# Patient Record
(demographics unavailable — no encounter records)

---

## 2025-05-13 NOTE — HISTORY OF PRESENT ILLNESS
[de-identified] : 36 year old female presents to [facility] care.  She reports lt leg pruritic rash for years and uses otc hydrocortisone with min improvement.

## 2025-05-13 NOTE — HEALTH RISK ASSESSMENT
[Good] : ~his/her~  mood as  good [Yes] : Yes [4 or more  times a week (4 pts)] : 4 or more  times a week (4 points) [1 or 2 (0 pts)] : 1 or 2 (0 points) [Never (0 pts)] : Never (0 points) [0] : 2) Feeling down, depressed, or hopeless: Not at all (0) [Patient reported PAP Smear was normal] : Patient reported PAP Smear was normal [HIV Test offered] : HIV Test offered [Hepatitis C test offered] : Hepatitis C test offered [With Significant Other] : lives with significant other [Employed] : employed [Significant Other] : lives with significant other [Sexually Active] : sexually active [Feels Safe at Home] : Feels safe at home [Fully functional (bathing, dressing, toileting, transferring, walking, feeding)] : Fully functional (bathing, dressing, toileting, transferring, walking, feeding) [Fully functional (using the telephone, shopping, preparing meals, housekeeping, doing laundry, using] : Fully functional and needs no help or supervision to perform IADLs (using the telephone, shopping, preparing meals, housekeeping, doing laundry, using transportation, managing medications and managing finances) [Reports normal functional visual acuity (ie: able to read med bottle)] : Reports normal functional visual acuity [Never] : Never [NO] : No [Change in mental status noted] : No change in mental status noted [High Risk Behavior] : no high risk behavior [Reports changes in hearing] : Reports no changes in hearing [Reports changes in vision] : Reports no changes in vision [Reports changes in dental health] : Reports no changes in dental health [Travel to Developing Areas] : does not  travel to developing areas [TB Exposure] : is not being exposed to tuberculosis [PapSmearComments] : 2022 [FreeTextEntry2] : Javier [de-identified] : gardenia

## 2025-05-13 NOTE — PHYSICAL EXAM
[No Acute Distress] : no acute distress [Well Nourished] : well nourished [Well Developed] : well developed [Well-Appearing] : well-appearing [Normal Sclera/Conjunctiva] : normal sclera/conjunctiva [PERRL] : pupils equal round and reactive to light [EOMI] : extraocular movements intact [Normal Outer Ear/Nose] : the outer ears and nose were normal in appearance [Normal Oropharynx] : the oropharynx was normal [No JVD] : no jugular venous distention [No Lymphadenopathy] : no lymphadenopathy [Supple] : supple [Thyroid Normal, No Nodules] : the thyroid was normal and there were no nodules present [No Respiratory Distress] : no respiratory distress  [No Accessory Muscle Use] : no accessory muscle use [Clear to Auscultation] : lungs were clear to auscultation bilaterally [Normal Rate] : normal rate  [Regular Rhythm] : with a regular rhythm [Normal S1, S2] : normal S1 and S2 [No Murmur] : no murmur heard [No Carotid Bruits] : no carotid bruits [No Abdominal Bruit] : a ~M bruit was not heard ~T in the abdomen [No Varicosities] : no varicosities [Pedal Pulses Present] : the pedal pulses are present [No Edema] : there was no peripheral edema [No Palpable Aorta] : no palpable aorta [No Extremity Clubbing/Cyanosis] : no extremity clubbing/cyanosis [Soft] : abdomen soft [Non Tender] : non-tender [Non-distended] : non-distended [No Masses] : no abdominal mass palpated [No HSM] : no HSM [Normal Bowel Sounds] : normal bowel sounds [Normal Posterior Cervical Nodes] : no posterior cervical lymphadenopathy [Normal Anterior Cervical Nodes] : no anterior cervical lymphadenopathy [No CVA Tenderness] : no CVA  tenderness [No Spinal Tenderness] : no spinal tenderness [No Joint Swelling] : no joint swelling [Grossly Normal Strength/Tone] : grossly normal strength/tone [Coordination Grossly Intact] : coordination grossly intact [No Focal Deficits] : no focal deficits [Normal Gait] : normal gait [Deep Tendon Reflexes (DTR)] : deep tendon reflexes were 2+ and symmetric [Normal Affect] : the affect was normal [Normal Insight/Judgement] : insight and judgment were intact [de-identified] : Leg weeping erythematous rash from calf to ankle

## 2025-06-02 NOTE — HISTORY OF PRESENT ILLNESS
[FreeTextEntry1] : NPA-Rash [de-identified] : Lissette Guy 37 y/o F presents for rash on the left leg -onset 7-8 years -saw pcp and got rx for hydroxyzine and TMC  - still very itchy

## 2025-06-02 NOTE — ASSESSMENT
[FreeTextEntry1] : #Atopic Dermatitis - acute eczematous plaque left lower leg, very itchy and excoriated - chronic and progressive, far from treatment goal - using TMC-->Stop and change to Rx clobetasol TID x 10 days - BSA 4%, IGA 3+ - RTC 10 days; if mostly clear at that time will change to protopic for long term management or Zoryve or Opzelura

## 2025-06-12 NOTE — ASSESSMENT
[FreeTextEntry1] : #Atopic Dermatitis, chronic not at treatment goal - improving but not at treatment goal - still moderate severeity plaques on the medial lower leg - thinner and less red than before - drop clobetasol to once daily plaques only - discussed sensitive skinc are recs incl free and clear detergent, rich emollients - RTC 4 weeks; at that time consider switching to non steroidal agent for ppx

## 2025-06-12 NOTE — HISTORY OF PRESENT ILLNESS
[FreeTextEntry1] : RPV-Rash [de-identified] : Lissette Lilliam 37 y/o F presents for follow up of Rash on the left leg -improved, swelling gone down a lot but sill has redness -clobetasol is helping but still itchy sometimes

## 2025-07-14 NOTE — HISTORY OF PRESENT ILLNESS
[FreeTextEntry1] : RPA -  Rash [de-identified] : Lissette Frazier 35y/o Female here to follow up on Atopic Dermatitis LV on 06/12/2025. - Pt reports that the affected area has been improving from last time. - pt is also concern about a rash she developed one to two days ago on the chest, itchy.

## 2025-07-14 NOTE — ASSESSMENT
[FreeTextEntry1] : #atopic dermatitis, left leg - improved and clear today - stop cloebtasol - gentle skin care - if needed can restart PRN clobetasol x 3-5 days, SED incl atrophy; RTC early if requiring frequent use  #Dermatographism/urticarial dermatitis - linear wheals on the mid chest where scratching - unclear trigger but localized - trial OTC antihistamines like zyrtec or claritin - RTC early if not resolving  RTC 1 year, earlier PRN